# Patient Record
Sex: FEMALE | Race: WHITE
[De-identification: names, ages, dates, MRNs, and addresses within clinical notes are randomized per-mention and may not be internally consistent; named-entity substitution may affect disease eponyms.]

---

## 2019-10-23 ENCOUNTER — HOSPITAL ENCOUNTER (OUTPATIENT)
Dept: HOSPITAL 74 - FMAMMOTONE | Age: 49
Discharge: HOME | End: 2019-10-23
Attending: SURGERY
Payer: COMMERCIAL

## 2019-10-23 DIAGNOSIS — R92.1: ICD-10-CM

## 2019-10-23 DIAGNOSIS — N60.91: ICD-10-CM

## 2019-10-23 DIAGNOSIS — N60.81: ICD-10-CM

## 2019-10-23 DIAGNOSIS — N64.89: ICD-10-CM

## 2019-10-23 DIAGNOSIS — N60.31: Primary | ICD-10-CM

## 2019-10-23 PROCEDURE — A4648 IMPLANTABLE TISSUE MARKER: HCPCS

## 2019-10-23 PROCEDURE — 0HBT3ZX EXCISION OF RIGHT BREAST, PERCUTANEOUS APPROACH, DIAGNOSTIC: ICD-10-PCS | Performed by: SURGERY

## 2019-10-24 NOTE — PATH
Surgical Pathology Report



Patient Name:  СВЕТЛАНА BENSON

Accession #:  [id number]

[facility]. Rec. #:  [id number]                                                        

   /Age/Gender:  1970 (Age: 49) / F

Account:  [id number]                                                          

             Location: [facility]

Taken:  10/23/2019

Received:  10/23/2019

Reported:  10/24/2019

Physicians:  Kathie Negron M.D.

  



Specimen(s) Received

A: RIGHT BREAST SPECIMEN - WITH CALCIFICATIONS 

B: RIGHT BREAST SPECIMEN - WITHOUT CACIFICATIONS 





Clinical History

Nonpalpable lesion

Mammographic findings: Microcalcification, suspicious







Final Diagnosis

A. BREAST, RIGHT, WITH CALCIFICATIONS, STEREOTACTIC CORE BIOPSY:

BENIGN BREAST PARENCHYMA STROMAL FIBROSIS, USUAL DUCTAL HYPERPLASIA, AND

ASSOCIATED RARE MICROCALCIFICATIONS.



B. BREAST, RIGHT, WITHOUT CALCIFICATIONS, STEREOTACTIC CORE BIOPSY: 

BREAST PARENCHYMA WITH FOCAL ATYPICAL DUCTAL HYPERPLASIA, USUAL DUCTAL

HYPERPLASIA, AND STROMAL FIBROSIS.







***Electronically Signed***

Leila Vaz M.D.





Gross Description

A. Received in formalin labeled "right breast with calcifications," are 4

tan-yellow, cylindrical portions of fibroadipose tissue ranging firm 0.8-1.0 cm

in length and averaging 0.4 cm in diameter. The specimens are submitted in toto

in one cassette.



B. Received in formalin labeled "right breast without calcifications," are 5

tan-yellow, cylindrical portions of fibroadipose tissue ranging from 0.7-1.1 cm

in length and averaging 0.4 cm in diameter. The specimens are submitted in toto

in one cassette.



Time to formalin fixation: 5 minutes

Total formalin fixation time: Approximately 6 hours.

/10/23/2019



[facility]/10/23/2019

## 2019-10-24 NOTE — OP
DATE OF OPERATION:  10/23/2019

 

PREOPERATIVE DIAGNOSIS:  Abnormal right mammography.

 

POSTOPERATIVE DIAGNOSIS:  Abnormal right mammography.

 

PROCEDURE:  Right stereotactic needle biopsy with clip.

 

SURGEON:  Kathie Pina MD

 

ANESTHESIA:  Local.

 

COMPLICATIONS:  None.

 

This was a sterile procedure.

 

INDICATION FOR PROCEDURE:  Patient presented with a screening mammogram that noted a

cluster of microcalcifications back in March, were thought to be likely _____.  On

repeat 6-month followup, these were thought to be more suspicious, and a biopsy was

recommended by the radiologist.  I agreed.  Therefore, she was scheduled for a right

stereotactic needle biopsy with clip.

 

PROCEDURE IN DETAIL:  Patient was brought to [facility] in [place], laid prone on the [place] table.  Using the cranial approach, the calcifications

in the upper right breast were identified.  A sterile prep was obtained.  A target

was chosen.  There was a positive stroke margin.  Using a petite needle, Suros needle

was used to take several cores from this area.  Cores showed 2 calcifications, _____

calcifications, but they were not close together.  A clip was deployed in the area. 

Hemostasis was assured with direct pressure.  The specimen was sent to pathology for

permanent section.  The incisions were closed with Steri-Strips.  She tolerated the

procedure well, left the breast imaging center in good condition.

 

 

KATHIE PINA M.D.

 

[id number]

DD: 10/23/2019 13:34

DT: 10/24/2019 08:16

Job #:  [id number]

## 2025-03-03 ENCOUNTER — OFFICE (OUTPATIENT)
Dept: URBAN - METROPOLITAN AREA CLINIC 29 | Facility: CLINIC | Age: 55
Setting detail: OPHTHALMOLOGY
End: 2025-03-03
Payer: COMMERCIAL

## 2025-03-03 DIAGNOSIS — H16.223: ICD-10-CM

## 2025-03-03 PROCEDURE — 92014 COMPRE OPH EXAM EST PT 1/>: CPT | Performed by: OPTOMETRIST

## 2025-03-03 ASSESSMENT — REFRACTION_CURRENTRX
OS_CYLINDER: +2.00
OS_SPHERE: +0.25
OD_CYLINDER: +1.50
OD_AXIS: 090
OD_SPHERE: -0.25
OS_SPHERE: -1.50
OD_CYLINDER: +1.00
OS_AXIS: 91
OS_ADD: +1.75
OD_SPHERE: +1.00
OS_OVR_VA: 20/
OS_OVR_VA: 20/
OD_OVR_VA: 20/
OS_AXIS: 090
OD_OVR_VA: 20/
OS_CYLINDER: +2.75
OD_ADD: +1.75
OD_AXIS: 91
OS_VPRISM_DIRECTION: PROGS
OD_VPRISM_DIRECTION: PROGS

## 2025-03-03 ASSESSMENT — REFRACTION_MANIFEST
OD_ADD: +2.00
OS_CYLINDER: +2.25
OD_VA1: 20/25
OD_ADD: +2.00
OS_VA1: 20/25
OS_ADD: +2.00
OS_AXIS: 90
OD_VA1: 20/25
OS_CYLINDER: +2.25
OD_VA1: 20/25
OS_AXIS: 90
OD_AXIS: 85
OS_ADD: +2.00
OS_CYLINDER: +2.00
OD_SPHERE: -0.25
OD_SPHERE: -0.25
OD_CYLINDER: +1.25
OD_AXIS: 85
OS_VA1: 20/25
OD_AXIS: 85
OD_SPHERE: -0.25
OS_SPHERE: -1.25
OS_SPHERE: -1.00
OS_AXIS: 90
OS_SPHERE: -1.25
OD_CYLINDER: +1.25
OS_ADD: +2.00
OD_ADD: +2.00
OD_CYLINDER: +1.25
OS_VA1: 20/25

## 2025-03-03 ASSESSMENT — REFRACTION_AUTOREFRACTION
OS_SPHERE: -0.50
OD_CYLINDER: +1.00
OD_AXIS: 085
OS_AXIS: 090
OS_CYLINDER: +2.00
OD_SPHERE: +0.50

## 2025-03-03 ASSESSMENT — TONOMETRY
OS_IOP_MMHG: 14
OD_IOP_MMHG: 14

## 2025-03-03 ASSESSMENT — SUPERFICIAL PUNCTATE KERATITIS (SPK)
OD_SPK: 1+
OS_SPK: 1+

## 2025-03-03 ASSESSMENT — CONFRONTATIONAL VISUAL FIELD TEST (CVF)
OD_FINDINGS: FULL
OS_FINDINGS: FULL

## 2025-03-03 ASSESSMENT — VISUAL ACUITY
OS_BCVA: 20/30+2
OD_BCVA: 20/20-2